# Patient Record
Sex: FEMALE | Race: NATIVE HAWAIIAN OR OTHER PACIFIC ISLANDER
[De-identification: names, ages, dates, MRNs, and addresses within clinical notes are randomized per-mention and may not be internally consistent; named-entity substitution may affect disease eponyms.]

---

## 2019-01-28 ENCOUNTER — HOSPITAL ENCOUNTER (OUTPATIENT)
Dept: HOSPITAL 5 - XRAY | Age: 84
Discharge: HOME | End: 2019-01-28
Attending: INTERNAL MEDICINE
Payer: MEDICARE

## 2019-01-28 DIAGNOSIS — M25.551: Primary | ICD-10-CM

## 2019-01-28 DIAGNOSIS — M47.896: ICD-10-CM

## 2019-01-28 DIAGNOSIS — I70.0: ICD-10-CM

## 2019-01-28 DIAGNOSIS — I87.8: ICD-10-CM

## 2019-01-28 NOTE — XRAY REPORT
RIGHT HIP RADIOGRAPHS



INDICATION: Right hip pain.



COMPARISON: None similar.



FINDINGS: An AP pelvic radiograph with frog-leg projection of the right 

hip demonstrate normal femoral head contours. Imaged bilateral SI and 

hip joints appear intact. Nonobstructive bowel gas pattern.  

Constipation not excluded.  Few pelvic phleboliths.  Lower lumbar 

degenerative changes and few extrinsic artifacts.  Possible osteopenia. 

 Right thigh atherosclerotic calcifications medially.



CONCLUSION: No acute radiographic abnormality with few other findings, 

as above.



Thank you for the opportunity to participate in this patient's care.